# Patient Record
Sex: FEMALE | Race: WHITE
[De-identification: names, ages, dates, MRNs, and addresses within clinical notes are randomized per-mention and may not be internally consistent; named-entity substitution may affect disease eponyms.]

---

## 2023-04-02 ENCOUNTER — HOSPITAL ENCOUNTER (EMERGENCY)
Dept: HOSPITAL 47 - EC | Age: 33
Discharge: HOME | End: 2023-04-02
Payer: COMMERCIAL

## 2023-04-02 VITALS — SYSTOLIC BLOOD PRESSURE: 116 MMHG | DIASTOLIC BLOOD PRESSURE: 92 MMHG | RESPIRATION RATE: 16 BRPM | HEART RATE: 69 BPM

## 2023-04-02 VITALS — TEMPERATURE: 98 F

## 2023-04-02 DIAGNOSIS — F13.239: Primary | ICD-10-CM

## 2023-04-02 DIAGNOSIS — F17.200: ICD-10-CM

## 2023-04-02 LAB
ALBUMIN SERPL-MCNC: 4.2 G/DL (ref 3.5–5)
ALP SERPL-CCNC: 111 U/L (ref 38–126)
ALT SERPL-CCNC: 56 U/L (ref 4–34)
ANION GAP SERPL CALC-SCNC: 10 MMOL/L
AST SERPL-CCNC: 58 U/L (ref 14–36)
BASOPHILS # BLD AUTO: 0.1 K/UL (ref 0–0.2)
BASOPHILS NFR BLD AUTO: 1 %
BUN SERPL-SCNC: 12 MG/DL (ref 7–17)
CALCIUM SPEC-MCNC: 9.2 MG/DL (ref 8.4–10.2)
CHLORIDE SERPL-SCNC: 102 MMOL/L (ref 98–107)
CO2 SERPL-SCNC: 26 MMOL/L (ref 22–30)
EOSINOPHIL # BLD AUTO: 0.4 K/UL (ref 0–0.7)
EOSINOPHIL NFR BLD AUTO: 6 %
ERYTHROCYTE [DISTWIDTH] IN BLOOD BY AUTOMATED COUNT: 4.65 M/UL (ref 3.8–5.4)
ERYTHROCYTE [DISTWIDTH] IN BLOOD: 15.3 % (ref 11.5–15.5)
GLUCOSE SERPL-MCNC: 101 MG/DL (ref 74–99)
HCT VFR BLD AUTO: 39 % (ref 34–46)
HGB BLD-MCNC: 12.6 GM/DL (ref 11.4–16)
HYALINE CASTS UR QL AUTO: 1 /LPF (ref 0–2)
LYMPHOCYTES # SPEC AUTO: 2.7 K/UL (ref 1–4.8)
LYMPHOCYTES NFR SPEC AUTO: 34 %
MCH RBC QN AUTO: 27.1 PG (ref 25–35)
MCHC RBC AUTO-ENTMCNC: 32.3 G/DL (ref 31–37)
MCV RBC AUTO: 83.8 FL (ref 80–100)
MONOCYTES # BLD AUTO: 0.4 K/UL (ref 0–1)
MONOCYTES NFR BLD AUTO: 5 %
NEUTROPHILS # BLD AUTO: 4 K/UL (ref 1.3–7.7)
NEUTROPHILS NFR BLD AUTO: 51 %
PH UR: 7 [PH] (ref 5–8)
PLATELET # BLD AUTO: 302 K/UL (ref 150–450)
POTASSIUM SERPL-SCNC: 5.4 MMOL/L (ref 3.5–5.1)
PROT SERPL-MCNC: 7.2 G/DL (ref 6.3–8.2)
RBC UR QL: 1 /HPF (ref 0–5)
SODIUM SERPL-SCNC: 138 MMOL/L (ref 137–145)
SP GR UR: 1.02 (ref 1–1.03)
SQUAMOUS UR QL AUTO: 5 /HPF (ref 0–4)
UROBILINOGEN UR QL STRIP: <2 MG/DL (ref ?–2)
WBC # BLD AUTO: 7.8 K/UL (ref 3.8–10.6)
WBC # UR AUTO: 4 /HPF (ref 0–5)

## 2023-04-02 PROCEDURE — 83605 ASSAY OF LACTIC ACID: CPT

## 2023-04-02 PROCEDURE — 96375 TX/PRO/DX INJ NEW DRUG ADDON: CPT

## 2023-04-02 PROCEDURE — 80053 COMPREHEN METABOLIC PANEL: CPT

## 2023-04-02 PROCEDURE — 81001 URINALYSIS AUTO W/SCOPE: CPT

## 2023-04-02 PROCEDURE — 96361 HYDRATE IV INFUSION ADD-ON: CPT

## 2023-04-02 PROCEDURE — 81025 URINE PREGNANCY TEST: CPT

## 2023-04-02 PROCEDURE — 96374 THER/PROPH/DIAG INJ IV PUSH: CPT

## 2023-04-02 PROCEDURE — 80306 DRUG TEST PRSMV INSTRMNT: CPT

## 2023-04-02 PROCEDURE — 96376 TX/PRO/DX INJ SAME DRUG ADON: CPT

## 2023-04-02 PROCEDURE — 99285 EMERGENCY DEPT VISIT HI MDM: CPT

## 2023-04-02 PROCEDURE — 85025 COMPLETE CBC W/AUTO DIFF WBC: CPT

## 2023-04-02 PROCEDURE — 36415 COLL VENOUS BLD VENIPUNCTURE: CPT

## 2023-04-02 NOTE — ED
Recheck HPI





- General


Chief Complaint: Recheck/Abnormal Lab/Rx


Stated Complaint: Pre-seizure aura, Headache


Time Seen by Provider: 04/02/23 19:32


Source: patient, EMS, RN notes reviewed


Mode of arrival: EMS


Limitations: no limitations





- History of Present Illness


Initial Comments: 


This is a 32-year-old female who presents to the emergency department with a 

headache and concerns of a possible seizure.  Patient is at [facility] for 

Xanax abuse.  States that she has not had Xanax in 5 days.  Her concern is that 

when she withdrawals from benzos, she often has withdrawal seizures.  This most 

recently occurred 5 years ago and was 5-6 days after taking the last dose of 

Xanax. She is on a taper with Ativan and phenobarbital.  [facility] recently 

decreased the Ativan dosing from 1 mg every 6 hours to 1 mg every 8 hours.  She 

has started to develop flashes and spots in the left eye, and states that this 

is what typically occurs before she has a seizure.  This started approximately 

1.5 hours ago.  She is also complaining of a headache.  Otherwise denies any 

concerns or complaints.





Denies any fevers, chills, sore throat, cough, dyspnea, chest pain, 

palpitations, abdominal pain, nausea, vomiting, diarrhea, or back pain.





MD Complaint: other (Headache, possible seizure)





- Related Data


                                    Allergies











Allergy/AdvReac Type Severity Reaction Status Date / Time


 


No Known Allergies Allergy   Verified 04/02/23 19:39














Review of Systems


ROS Statement: 


Those systems with pertinent positive or pertinent negative responses have been 

documented in the HPI.





ROS Other: All systems not noted in ROS Statement are negative.





Past Medical History


Past Medical History: No Reported History


Additional Past Medical History / Comment(s): Withdrawing seizures from 

benzodezapines, Gestational Diabetes


History of Any Multi-Drug Resistant Organisms: None Reported


Additional Past Surgical History / Comment(s): Three C-sections


Past Psychological History: Anxiety, Bipolar, Depression, Panic Disorder, PTSD


Smoking Status: Current every day smoker


Past Alcohol Use History: None Reported


Past Drug Use History: Prescription Drug Abuse





General Exam


Limitations: no limitations


General appearance: alert, in no apparent distress


Head exam: Present: atraumatic, normocephalic, normal inspection


Eye exam: Present: normal appearance, PERRL, EOMI.  Absent: scleral icterus, 

conjunctival injection, periorbital swelling


Respiratory exam: Present: normal lung sounds bilaterally.  Absent: respiratory 

distress, wheezes, rales, rhonchi, stridor


Cardiovascular Exam: Present: regular rate, normal rhythm, normal heart sounds. 

Absent: systolic murmur, diastolic murmur, rubs, gallop, clicks


Neurological exam: Present: alert, oriented X3, CN II-XII intact


Psychiatric exam: Present: normal affect, normal mood


Skin exam: Present: warm, dry, intact, normal color.  Absent: rash





Course


                                   Vital Signs











  04/02/23 04/02/23 04/02/23





  19:32 20:10 20:50


 


Temperature 98.2 F  


 


Pulse Rate 86 77 81


 


Respiratory 7 L 16 17





Rate   


 


Blood Pressure 129/89 128/78 124/91


 


O2 Sat by Pulse 97 97 97





Oximetry   














  04/02/23 04/02/23





  22:04 22:20


 


Temperature  98.0 F


 


Pulse Rate 69 


 


Respiratory 16 





Rate  


 


Blood Pressure 116/92 


 


O2 Sat by Pulse 97 





Oximetry  














Medical Decision Making





- Medical Decision Making


This is a 32-year-old female who presents to the emergency department for a 

headache and concerns of a possible seizure.





Was pt. sent in by a medical professional or institution?


@  -Yes, [facility]


Did you speak to anyone other than the patient for history?  


@  -No


Did you review nursing and triage notes? 


@  -Yes, and I agree, it is accurate with regards to the patient's symptoms.


Were old charts reviewed? 


@  -No


Differential Diagnosis? 


@  -Differential Headache:


Migraine, tension, cluster, carbon monoxide, central venous thrombosis, pension 

karma temporal arteritis, acute closure glaucoma, intercranial hemorrhage, 

mastoiditis, sinusitis, head injury, this is not meant to be an all-inclusive 

list.


What testing was considered but not performed? (CT, X-rays, U/S, labs)? Why?


@  -None


What meds were considered but not given? Why?


@  -None


Did you discuss the management of the patient with other professionals?


@  -No


Did you reconcile home meds?


@  -No


Was smoking cessation discussed for >3mins.?


@  -No


Was critical care preformed (if so, how long)?


@  -No


Were there social determinants of health that impacted care today? How? 

(Homelessness, low income, unemployed, alcoholism, drug addiction, trans

portation, low edu. Level, literacy, decrease access to med. care, skilled nursing, rehab)?


@  -Patient is in rehab at [facility] for Xanax abuse.  This is contributing 

to the patient's overall poor health status and concern for withdrawal seizures 

that she is experiencing today.


Was there de-escalation of care discussed even if they declined? (Discuss DNR or

withdrawal of care, Hospice)?


@  -No


What co-morbidities impacted this encounter? (DM, HTN, Smoking, COPD, CAD, 

Cancer, CVA, Hep., AIDS, mental health diagnosis, sleep apnea, morbid obesity)?


@  -Benzodiazepine abuse


Was patient admitted / discharged?


@  -Discharged.  Seizure pads and seizure precautions were initiated immediately

upon arrival.  Lab work obtained and found to be nonactionable.  Patient given 

IV fluids, Toradol, and 1 mg of Ativan.  States that her headache improved 

substantially.  She continued to have the aura sensation, however it was 

improved with the Ativan.  Patient subsequently given another 1 mg of Ativan.  

Patient felt better, but continued to have that sensation of an aura.  Case di

scussed with Dr. Serna, who advised a dose of Librium.  Patient given Librium, 

50 mg.  She was then discharged back into the care of [facility] in stable 

condition.  Discussed that she needs to discuss her taper schedule with [facility], in the event that it is too fast for her.


Undiagnosed new problem with uncertain prognosis?


@  -None


Drug Therapy requiring intensive monitoring for toxicity (Heparin, Nitro, 

Insulin, Cardizem)?


@  -None


Were any procedures done?


@  -None


Diagnosis/symptom?


@  -Benzodiazepine abuse, benzodiazepine withdrawal


Acute, or Chronic, or Acute on Chronic?


@  -Acute


Uncomplicated (without systemic symptoms) or Complicated (systemic symptoms)?


@  -Complicated


Side effects of treatment?


@  -None


Exacerbation, Progression, or Severe Exacerbation]


@  -Not applicable


Poses a threat to life or bodily function?


@  -Yes, continuing to abuse the Xanax can become a life threatening problem if 

she were to overdose.





Return precautions reviewed in depth, the patient is instructed to return to the

emergency department with any new, worsening, or concerning symptoms. Patient 

verbalized understanding. 





This case was discussed in detail with the attending ED physician, Dr. Serna. 

Presentation, findings, and treatment plan discussed in detail as well. 








- Lab Data


Result diagrams: 


                                 04/02/23 19:41





                                 04/02/23 19:41


                                   Lab Results











  04/02/23 04/02/23 04/02/23 Range/Units





  19:41 19:41 19:41 


 


WBC  7.8    (3.8-10.6)  k/uL


 


RBC  4.65    (3.80-5.40)  m/uL


 


Hgb  12.6    (11.4-16.0)  gm/dL


 


Hct  39.0    (34.0-46.0)  %


 


MCV  83.8    (80.0-100.0)  fL


 


MCH  27.1    (25.0-35.0)  pg


 


MCHC  32.3    (31.0-37.0)  g/dL


 


RDW  15.3    (11.5-15.5)  %


 


Plt Count  302    (150-450)  k/uL


 


MPV  7.8    


 


Neutrophils %  51    %


 


Lymphocytes %  34    %


 


Monocytes %  5    %


 


Eosinophils %  6    %


 


Basophils %  1    %


 


Neutrophils #  4.0    (1.3-7.7)  k/uL


 


Lymphocytes #  2.7    (1.0-4.8)  k/uL


 


Monocytes #  0.4    (0-1.0)  k/uL


 


Eosinophils #  0.4    (0-0.7)  k/uL


 


Basophils #  0.1    (0-0.2)  k/uL


 


Sodium     (137-145)  mmol/L


 


Potassium     (3.5-5.1)  mmol/L


 


Chloride     ()  mmol/L


 


Carbon Dioxide     (22-30)  mmol/L


 


Anion Gap     mmol/L


 


BUN     (7-17)  mg/dL


 


Creatinine     (0.52-1.04)  mg/dL


 


Est GFR (CKD-EPI)AfAm     (>60 ml/min/1.73 sqM)  


 


Est GFR (CKD-EPI)NonAf     (>60 ml/min/1.73 sqM)  


 


Glucose     (74-99)  mg/dL


 


Plasma Lactic Acid Erich     (0.7-2.0)  mmol/L


 


Calcium     (8.4-10.2)  mg/dL


 


Total Bilirubin     (0.2-1.3)  mg/dL


 


AST     (14-36)  U/L


 


ALT     (4-34)  U/L


 


Alkaline Phosphatase     ()  U/L


 


Total Protein     (6.3-8.2)  g/dL


 


Albumin     (3.5-5.0)  g/dL


 


Urine Color   Yellow   


 


Urine Appearance   Cloudy H   (Clear)  


 


Urine pH   7.0   (5.0-8.0)  


 


Ur Specific Gravity   1.024   (1.001-1.035)  


 


Urine Protein   Negative   (Negative)  


 


Urine Glucose (UA)   Negative   (Negative)  


 


Urine Ketones   Negative   (Negative)  


 


Urine Blood   Negative   (Negative)  


 


Urine Nitrite   Negative   (Negative)  


 


Urine Bilirubin   Negative   (Negative)  


 


Urine Urobilinogen   <2.0   (<2.0)  mg/dL


 


Ur Leukocyte Esterase   Trace H   (Negative)  


 


Urine RBC   1   (0-5)  /hpf


 


Urine WBC   4   (0-5)  /hpf


 


Ur Squamous Epith Cells   5 H   (0-4)  /hpf


 


Urine Bacteria   Rare H   (None)  /hpf


 


Hyaline Casts   1   (0-2)  /lpf


 


Urine Mucus   Rare H   (None)  /hpf


 


Urine HCG, Qual    Not Detected  (Not Detectd)  


 


Urine Opiates Screen   Not Detected   (NotDetected)  


 


Ur Oxycodone Screen   Not Detected   (NotDetected)  


 


Urine Methadone Screen   Detected H   (NotDetected)  


 


Ur Propoxyphene Screen   Not Detected   (NotDetected)  


 


Ur Barbiturates Screen   Detected H   (NotDetected)  


 


U Tricyclic Antidepress   Not Detected   (NotDetected)  


 


Ur Phencyclidine Scrn   Not Detected   (NotDetected)  


 


Ur Amphetamines Screen   Not Detected   (NotDetected)  


 


U Methamphetamines Scrn   Not Detected   (NotDetected)  


 


U Benzodiazepines Scrn   Detected H   (NotDetected)  


 


Urine Cocaine Screen   Not Detected   (NotDetected)  


 


U Marijuana (THC) Screen   Not Detected   (NotDetected)  














  04/02/23 04/02/23 Range/Units





  19:41 19:41 


 


WBC    (3.8-10.6)  k/uL


 


RBC    (3.80-5.40)  m/uL


 


Hgb    (11.4-16.0)  gm/dL


 


Hct    (34.0-46.0)  %


 


MCV    (80.0-100.0)  fL


 


MCH    (25.0-35.0)  pg


 


MCHC    (31.0-37.0)  g/dL


 


RDW    (11.5-15.5)  %


 


Plt Count    (150-450)  k/uL


 


MPV    


 


Neutrophils %    %


 


Lymphocytes %    %


 


Monocytes %    %


 


Eosinophils %    %


 


Basophils %    %


 


Neutrophils #    (1.3-7.7)  k/uL


 


Lymphocytes #    (1.0-4.8)  k/uL


 


Monocytes #    (0-1.0)  k/uL


 


Eosinophils #    (0-0.7)  k/uL


 


Basophils #    (0-0.2)  k/uL


 


Sodium  138   (137-145)  mmol/L


 


Potassium  5.4 H   (3.5-5.1)  mmol/L


 


Chloride  102   ()  mmol/L


 


Carbon Dioxide  26   (22-30)  mmol/L


 


Anion Gap  10   mmol/L


 


BUN  12   (7-17)  mg/dL


 


Creatinine  0.41 L   (0.52-1.04)  mg/dL


 


Est GFR (CKD-EPI)AfAm  >90   (>60 ml/min/1.73 sqM)  


 


Est GFR (CKD-EPI)NonAf  >90   (>60 ml/min/1.73 sqM)  


 


Glucose  101 H   (74-99)  mg/dL


 


Plasma Lactic Acid Erich   1.6  (0.7-2.0)  mmol/L


 


Calcium  9.2   (8.4-10.2)  mg/dL


 


Total Bilirubin  0.6   (0.2-1.3)  mg/dL


 


AST  58 H   (14-36)  U/L


 


ALT  56 H   (4-34)  U/L


 


Alkaline Phosphatase  111   ()  U/L


 


Total Protein  7.2   (6.3-8.2)  g/dL


 


Albumin  4.2   (3.5-5.0)  g/dL


 


Urine Color    


 


Urine Appearance    (Clear)  


 


Urine pH    (5.0-8.0)  


 


Ur Specific Gravity    (1.001-1.035)  


 


Urine Protein    (Negative)  


 


Urine Glucose (UA)    (Negative)  


 


Urine Ketones    (Negative)  


 


Urine Blood    (Negative)  


 


Urine Nitrite    (Negative)  


 


Urine Bilirubin    (Negative)  


 


Urine Urobilinogen    (<2.0)  mg/dL


 


Ur Leukocyte Esterase    (Negative)  


 


Urine RBC    (0-5)  /hpf


 


Urine WBC    (0-5)  /hpf


 


Ur Squamous Epith Cells    (0-4)  /hpf


 


Urine Bacteria    (None)  /hpf


 


Hyaline Casts    (0-2)  /lpf


 


Urine Mucus    (None)  /hpf


 


Urine HCG, Qual    (Not Detectd)  


 


Urine Opiates Screen    (NotDetected)  


 


Ur Oxycodone Screen    (NotDetected)  


 


Urine Methadone Screen    (NotDetected)  


 


Ur Propoxyphene Screen    (NotDetected)  


 


Ur Barbiturates Screen    (NotDetected)  


 


U Tricyclic Antidepress    (NotDetected)  


 


Ur Phencyclidine Scrn    (NotDetected)  


 


Ur Amphetamines Screen    (NotDetected)  


 


U Methamphetamines Scrn    (NotDetected)  


 


U Benzodiazepines Scrn    (NotDetected)  


 


Urine Cocaine Screen    (NotDetected)  


 


U Marijuana (THC) Screen    (NotDetected)  














Disposition


Clinical Impression: 


 Benzodiazepine withdrawal





Disposition: HOME SELF-CARE


Instructions (If sedation given, give patient instructions):  Benzodiazepine 

Abuse (ED)


Additional Instructions: 


Return to the emergency department with any new, worsening, or concerning 

symptoms.  Discuss your taper regimen with [facility] to see if they can slow 

it down to reduce your risk of withdrawal seizures.  Follow up with your primary

care provider in 1-2 days.


Is patient prescribed a controlled substance at d/c from ED?: No


Referrals: 


Nonstaff,Physician [Primary Care Provider] - 1-2 days